# Patient Record
Sex: FEMALE | Race: WHITE | NOT HISPANIC OR LATINO | Employment: UNEMPLOYED | URBAN - METROPOLITAN AREA
[De-identification: names, ages, dates, MRNs, and addresses within clinical notes are randomized per-mention and may not be internally consistent; named-entity substitution may affect disease eponyms.]

---
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
## 2022-01-28 ENCOUNTER — OFFICE VISIT (OUTPATIENT)
Dept: URGENT CARE | Facility: CLINIC | Age: 22
End: 2022-01-28
Payer: COMMERCIAL

## 2022-01-28 VITALS
BODY MASS INDEX: 20.77 KG/M2 | WEIGHT: 110 LBS | HEIGHT: 61 IN | TEMPERATURE: 98 F | RESPIRATION RATE: 16 BRPM | HEART RATE: 92 BPM | DIASTOLIC BLOOD PRESSURE: 62 MMHG | SYSTOLIC BLOOD PRESSURE: 100 MMHG | OXYGEN SATURATION: 100 %

## 2022-01-28 DIAGNOSIS — L73.9 FOLLICULITIS: Primary | ICD-10-CM

## 2022-01-28 PROCEDURE — 1160F RVW MEDS BY RX/DR IN RCRD: CPT | Mod: CPTII,S$GLB,, | Performed by: PHYSICIAN ASSISTANT

## 2022-01-28 PROCEDURE — 3078F DIAST BP <80 MM HG: CPT | Mod: CPTII,S$GLB,, | Performed by: PHYSICIAN ASSISTANT

## 2022-01-28 PROCEDURE — 3078F PR MOST RECENT DIASTOLIC BLOOD PRESSURE < 80 MM HG: ICD-10-PCS | Mod: CPTII,S$GLB,, | Performed by: PHYSICIAN ASSISTANT

## 2022-01-28 PROCEDURE — 3008F PR BODY MASS INDEX (BMI) DOCUMENTED: ICD-10-PCS | Mod: CPTII,S$GLB,, | Performed by: PHYSICIAN ASSISTANT

## 2022-01-28 PROCEDURE — 3074F PR MOST RECENT SYSTOLIC BLOOD PRESSURE < 130 MM HG: ICD-10-PCS | Mod: CPTII,S$GLB,, | Performed by: PHYSICIAN ASSISTANT

## 2022-01-28 PROCEDURE — 99213 OFFICE O/P EST LOW 20 MIN: CPT | Mod: S$GLB,,, | Performed by: PHYSICIAN ASSISTANT

## 2022-01-28 PROCEDURE — 1159F MED LIST DOCD IN RCRD: CPT | Mod: CPTII,S$GLB,, | Performed by: PHYSICIAN ASSISTANT

## 2022-01-28 PROCEDURE — 3008F BODY MASS INDEX DOCD: CPT | Mod: CPTII,S$GLB,, | Performed by: PHYSICIAN ASSISTANT

## 2022-01-28 PROCEDURE — 99213 PR OFFICE/OUTPT VISIT, EST, LEVL III, 20-29 MIN: ICD-10-PCS | Mod: S$GLB,,, | Performed by: PHYSICIAN ASSISTANT

## 2022-01-28 PROCEDURE — 3074F SYST BP LT 130 MM HG: CPT | Mod: CPTII,S$GLB,, | Performed by: PHYSICIAN ASSISTANT

## 2022-01-28 PROCEDURE — 1160F PR REVIEW ALL MEDS BY PRESCRIBER/CLIN PHARMACIST DOCUMENTED: ICD-10-PCS | Mod: CPTII,S$GLB,, | Performed by: PHYSICIAN ASSISTANT

## 2022-01-28 PROCEDURE — 1159F PR MEDICATION LIST DOCUMENTED IN MEDICAL RECORD: ICD-10-PCS | Mod: CPTII,S$GLB,, | Performed by: PHYSICIAN ASSISTANT

## 2022-01-28 RX ORDER — SULFAMETHOXAZOLE AND TRIMETHOPRIM 800; 160 MG/1; MG/1
1 TABLET ORAL 2 TIMES DAILY
Qty: 20 TABLET | Refills: 0 | Status: SHIPPED | OUTPATIENT
Start: 2022-01-28 | End: 2022-02-07

## 2022-01-28 NOTE — PROGRESS NOTES
"Subjective:       Patient ID: Mee Rapp is a 21 y.o. female.    Vitals:  height is 5' 1" (1.549 m) and weight is 49.9 kg (110 lb). Her tympanic temperature is 97.8 °F (36.6 °C). Her blood pressure is 100/62 and her pulse is 92. Her respiration is 16 and oxygen saturation is 100%.     Chief Complaint: Groin Swelling (Red & swollen bump by vag area x 2 days )    Patient presented here today w/Red & swollen bump by Rt vulva x 1  days. Pt denies fever or drainage. No vag d/c, rash, pelvic pain, fever, N/V, dysuria, hematuria or concern for STD      Vaginal Pain  The patient's pertinent negatives include no genital itching, genital lesions, genital odor, genital rash, missed menses, pelvic pain, vaginal bleeding or vaginal discharge. This is a new problem. The current episode started yesterday. The problem occurs constantly. The problem has been unchanged. The pain is mild. She is not pregnant. Pertinent negatives include no abdominal pain, anorexia, back pain, chills, constipation, diarrhea, discolored urine, dysuria, fever, flank pain, frequency, headaches, hematuria, joint pain, joint swelling, nausea, painful intercourse, rash, sore throat, urgency or vomiting. She has tried heating pads, warm baths and NSAIDs for the symptoms. The treatment provided mild relief. She is not sexually active. No, her partner does not have an STD. She uses nothing for contraception. Her menstrual history has been regular. There is no history of ovarian cysts.       Constitution: Negative for chills and fever.   HENT: Negative for sore throat.    Gastrointestinal: Negative for abdominal pain, nausea, vomiting, constipation and diarrhea.   Genitourinary: Positive for vaginal pain. Negative for dysuria, frequency, urgency, urine decreased, flank pain, hematuria, painful menstruation, irregular menstruation, missed menses, heavy menstrual bleeding, ovarian cysts, genital trauma, vaginal discharge, vaginal bleeding, vaginal odor, painful " intercourse, genital sore, painful ejaculation and pelvic pain.   Musculoskeletal: Negative for back pain.   Skin: Positive for skin thickening/induration. Negative for rash, wound and abrasion.   Neurological: Negative for headaches.       Objective:      Physical Exam   Constitutional: She is oriented to person, place, and time. She appears well-developed and well-nourished.   HENT:   Head: Normocephalic and atraumatic.   Ears:   Right Ear: External ear normal.   Left Ear: External ear normal.   Nose: Nose normal. No nasal deformity. No epistaxis.   Mouth/Throat: Oropharynx is clear and moist and mucous membranes are normal.   Eyes: Lids are normal.   Neck: Trachea normal and phonation normal. Neck supple.   Cardiovascular: Normal pulses.   Pulmonary/Chest: Effort normal.   Abdominal: Normal appearance and bowel sounds are normal. She exhibits no distension. Soft. There is no abdominal tenderness. There is no CVA tenderness.   Genitourinary:         Neurological: She is alert and oriented to person, place, and time.   Skin: Skin is warm, dry and intact.   Psychiatric: She has a normal mood and affect. Her speech is normal and behavior is normal. Cognition and memory  Nursing note and vitals reviewed.        Assessment:       1. Folliculitis          Plan:         Folliculitis    Other orders  -     sulfamethoxazole-trimethoprim 800-160mg (BACTRIM DS) 800-160 mg Tab; Take 1 tablet by mouth 2 (two) times daily. for 10 days  Dispense: 20 tablet; Refill: 0    Estefany Valencia PA-C  [facility]         Patient Instructions   Warm epsom salt soaks 3-4 x daily. Complete bactrim for at least 7 up to 10 days.     Closely monitor area for any spreading redness, abscess (head to infection which would need to be opened), or fever - this warrants re-evaluation. Avoid shaving the area. Patient Education       Bacterial Folliculitis   The Basics   Written by the doctors and editors at [employer]To[employer]   What is  folliculitis? -- Folliculitis is a skin problem that happens when a hair follicle gets infected (figure 1). A hair follicle is a sac under the skin where a hair starts to grow. Usually, folliculitis happens because bacteria (a kind of germ) get into the hair follicle. Occasionally, folliculitis is caused by a fungus or virus in the hair follicle, or because of another reason.  What are the symptoms of bacterial folliculitis? -- The main symptom is a group of small, raised red bumps on your skin. These bumps can be tender or itchy, and they might have pus in them.  What can I do on my own to treat it? -- Wet a clean washcloth with warm water and put it on the bumps. When the cloth cools, reheat it with warm water and put it back on the area. Repeat these steps for 10 to 15 minutes, 3 times a day.  Do not shave the area that has folliculitis. That will just irritate it more and might spread the infection.  Should I see a doctor or nurse? -- See your doctor or nurse if the folliculitis does not go away after you treat it at home. You should also see your doctor or nurse if:  · The bumps get larger or more painful  · The bumps go away but then come back  · You get a fever  Will I need tests? -- Not usually. To make sure you do not have another skin condition, your doctor or nurse will ask about your symptoms and do an exam. If it is hard to tell what is causing your folliculitis, they might test a sample of pus, or do a different test.  What other treatment might I have? -- If your bacterial folliculitis does not go away on its own, your doctor might prescribe an antibiotic cream, ointment, or liquid to put on your skin. If a lot of your skin is affected, you might need an antibiotic pill. But most cases of folliculitis get better without treatment.  All topics are updated as new evidence becomes available and our peer review process is complete.  This topic retrieved from [email or web address] on: Sep 21, 2021.  Topic 57446 Version  6.0  Release: 29.4.2 - C29.263  © 2021 UpToDate, Inc. and/or its affiliates. All rights reserved.  figure 1: Infected hair follicle     Graphic 24056 Version 1.0    Consumer Information Use and Disclaimer   This information is not specific medical advice and does not replace information you receive from your health care provider. This is only a brief summary of general information. It does NOT include all information about conditions, illnesses, injuries, tests, procedures, treatments, therapies, discharge instructions or life-style choices that may apply to you. You must talk with your health care provider for complete information about your health and treatment options. This information should not be used to decide whether or not to accept your health care provider's advice, instructions or recommendations. Only your health care provider has the knowledge and training to provide advice that is right for you. The use of this information is governed by the [employer] End User License Agreement, available at https://www.Waterfall.TeachStreet/en/solutions/[employer]/about/kai.The use of [facility] content is governed by the [employer] Terms of Use. ©2021 UpToDate, Inc. All rights reserved.  Copyright   © 2021 UpToDate, Inc. and/or its affiliates. All rights reserved.

## 2022-01-28 NOTE — LETTER
January 28, 2022      [facility]  [address] [place] [address] [place] [address]  Phone: [phone]  Fax: [phone]       Patient: Mee Rapp   YOB: 2000  Date of Visit: 01/28/2022    To Whom It May Concern:    Susy Rapp  was at [facility] on 01/28/2022. The patient may return to work/school on 01/29/22 with no restrictions. If you have any questions or concerns, or if I can be of further assistance, please do not hesitate to contact me.    Sincerely,    Estefany Valencia PA-C

## 2022-01-28 NOTE — PATIENT INSTRUCTIONS
IORT Simulation Note    Date: 3/4/2021  Patient Name: Osvaldo Strickland  YOB: 1966  MRN: [id number]    The patient has undergone a lumpectomy and sentinel lymph node biopsy for her early stage breast cancer. After evaluation of the specimens, the decision has been confirmed that she continues to be a candidate for intraoperative radiotherapy. The  3.5 cm intraoperative radiotherapy applicator was then placed and purse string sutures were placed at the incision site to minimize incision site skin dose. Under ultrasound guidance, the smallest distance from the applicator to the skin surface was found to be 18.2 mm. I judged this distance to be acceptable based upon the standard of care. I then approved treatment to this region. I was present throughout the simulation. Warm epsom salt soaks 3-4 x daily. Complete bactrim for at least 7 up to 10 days.     Closely monitor area for any spreading redness, abscess (head to infection which would need to be opened), or fever - this warrants re-evaluation. Avoid shaving the area. Patient Education       Bacterial Folliculitis   The Basics   Written by the doctors and editors at [facility]   What is folliculitis? -- Folliculitis is a skin problem that happens when a hair follicle gets infected (figure 1). A hair follicle is a sac under the skin where a hair starts to grow. Usually, folliculitis happens because bacteria (a kind of germ) get into the hair follicle. Occasionally, folliculitis is caused by a fungus or virus in the hair follicle, or because of another reason.  What are the symptoms of bacterial folliculitis? -- The main symptom is a group of small, raised red bumps on your skin. These bumps can be tender or itchy, and they might have pus in them.  What can I do on my own to treat it? -- Wet a clean washcloth with warm water and put it on the bumps. When the cloth cools, reheat it with warm water and put it back on the area. Repeat these steps for 10 to 15 minutes, 3 times a day.  Do not shave the area that has folliculitis. That will just irritate it more and might spread the infection.  Should I see a doctor or nurse? -- See your doctor or nurse if the folliculitis does not go away after you treat it at home. You should also see your doctor or nurse if:  · The bumps get larger or more painful  · The bumps go away but then come back  · You get a fever  Will I need tests? -- Not usually. To make sure you do not have another skin condition, your doctor or nurse will ask about your symptoms and do an exam. If it is hard to tell what is causing your folliculitis, they might test a sample of pus, or do a different test.  What other treatment might I have? -- If your bacterial folliculitis does not go away on its own, your doctor might  prescribe an antibiotic cream, ointment, or liquid to put on your skin. If a lot of your skin is affected, you might need an antibiotic pill. But most cases of folliculitis get better without treatment.  All topics are updated as new evidence becomes available and our peer review process is complete.  This topic retrieved from [employer] on: Sep 21, 2021.  Topic 98490 Version 6.0  Release: 29.4.2 - C29.263  © 2021 UpToDate, Inc. and/or its affiliates. All rights reserved.  figure 1: Infected hair follicle     Graphic 15551 Version 1.0    Consumer Information Use and Disclaimer   This information is not specific medical advice and does not replace information you receive from your health care provider. This is only a brief summary of general information. It does NOT include all information about conditions, illnesses, injuries, tests, procedures, treatments, therapies, discharge instructions or life-style choices that may apply to you. You must talk with your health care provider for complete information about your health and treatment options. This information should not be used to decide whether or not to accept your health care provider's advice, instructions or recommendations. Only your health care provider has the knowledge and training to provide advice that is right for you. The use of this information is governed by the [employer] End User License Agreement, available at https://www.[employer].8aweek/en/solutions/[employer]/about/kai.The use of [employer] content is governed by the [employer] Terms of Use. ©2021 UpToDate, Inc. All rights reserved.  Copyright   © 2021 UpToDate, Inc. and/or its affiliates. All rights reserved.

## 2022-01-31 ENCOUNTER — TELEPHONE (OUTPATIENT)
Dept: URGENT CARE | Facility: CLINIC | Age: 22
End: 2022-01-31
Payer: COMMERCIAL

## 2022-10-31 ENCOUNTER — IMMUNIZATION (OUTPATIENT)
Dept: URGENT CARE | Facility: CLINIC | Age: 22
End: 2022-10-31
Payer: COMMERCIAL

## 2022-10-31 PROCEDURE — 90471 IMMUNIZATION ADMIN: CPT | Mod: S$GLB,,,

## 2022-10-31 PROCEDURE — 90471 FLU VACCINE (QUAD) GREATER THAN OR EQUAL TO 3YO PRESERVATIVE FREE IM: ICD-10-PCS | Mod: S$GLB,,,

## 2022-10-31 PROCEDURE — 90686 FLU VACCINE (QUAD) GREATER THAN OR EQUAL TO 3YO PRESERVATIVE FREE IM: ICD-10-PCS | Mod: S$GLB,,,

## 2022-10-31 PROCEDURE — 90686 IIV4 VACC NO PRSV 0.5 ML IM: CPT | Mod: S$GLB,,,

## 2022-10-31 NOTE — LETTER
October 31, 2022      [facility]  [address] [place] [address] [place] [address]  Phone: [phone]  Fax: [phone]       Patient: Mee Rapp   YOB: 2000  Date of Visit: 10/31/2022    To Whom It May Concern:    Susy Rapp  was at [facility] on 10/31/2022. The patient may return to work/school on 10/31/2022 with no restrictions. If you have any questions or concerns, or if I can be of further assistance, please do not hesitate to contact me.    Sincerely,          Aashish Milner MA

## 2023-09-25 ENCOUNTER — OFFICE VISIT (OUTPATIENT)
Dept: URGENT CARE | Facility: CLINIC | Age: 23
End: 2023-09-25
Payer: COMMERCIAL

## 2023-09-25 VITALS
BODY MASS INDEX: 20.77 KG/M2 | SYSTOLIC BLOOD PRESSURE: 113 MMHG | HEIGHT: 61 IN | OXYGEN SATURATION: 100 % | DIASTOLIC BLOOD PRESSURE: 65 MMHG | TEMPERATURE: 99 F | HEART RATE: 84 BPM | WEIGHT: 110 LBS | RESPIRATION RATE: 16 BRPM

## 2023-09-25 DIAGNOSIS — J02.9 SORE THROAT: Primary | ICD-10-CM

## 2023-09-25 LAB
CTP QC/QA: YES
CTP QC/QA: YES
HETEROPH AB SER QL: NEGATIVE
MOLECULAR STREP A: NEGATIVE

## 2023-09-25 PROCEDURE — 86308 HETEROPHILE ANTIBODY SCREEN: CPT | Mod: QW,S$GLB,,

## 2023-09-25 PROCEDURE — 87651 POCT STREP A MOLECULAR: ICD-10-PCS | Mod: QW,S$GLB,,

## 2023-09-25 PROCEDURE — 99213 PR OFFICE/OUTPT VISIT, EST, LEVL III, 20-29 MIN: ICD-10-PCS | Mod: S$GLB,,,

## 2023-09-25 PROCEDURE — 99213 OFFICE O/P EST LOW 20 MIN: CPT | Mod: S$GLB,,,

## 2023-09-25 PROCEDURE — 87651 STREP A DNA AMP PROBE: CPT | Mod: QW,S$GLB,,

## 2023-09-25 PROCEDURE — 86308 POCT INFECTIOUS MONONUCLEOSIS: ICD-10-PCS | Mod: QW,S$GLB,,

## 2023-09-25 RX ORDER — BENZONATATE 200 MG/1
200 CAPSULE ORAL 3 TIMES DAILY PRN
Qty: 30 CAPSULE | Refills: 0 | Status: SHIPPED | OUTPATIENT
Start: 2023-09-25

## 2023-09-25 RX ORDER — ESCITALOPRAM OXALATE 10 MG/1
10 TABLET ORAL DAILY
COMMUNITY

## 2023-09-25 NOTE — PATIENT INSTRUCTIONS
PLEASE READ YOUR DISCHARGE INSTRUCTIONS ENTIRELY AS IT CONTAINS IMPORTANT INFORMATION.    Please drink plenty of fluids.    Please get plenty of rest.    Please return here or go to the Emergency Department for any concerns or worsening of condition.    Please take an over the counter antihistamine medication (Allegra/Claritin/Zyrtec) of your choice as directed for allergy symptoms and/or runny nose and postnasal drip.    Try an over the counter decongestant for sinus pressure/ear pressure, congestion symptoms like Mucinex D or Sudafed or Phenylephrine. You buy this behind the pharmacy counter.    If you do have Hypertension or palpitations, it is safe to take Coricidin HBP for relief of sinus symptoms.    Tylenol or ibuprofen can also be used as directed for pain and fever unless you have an allergy to them or medical condition such as stomach ulcers, kidney or liver disease or blood thinners etc for which you should not be taking these type of medications.     Sore throat recommendations: Warm fluids, warm salt water gargles, throat lozenges, tea, honey, soup, rest, hydration.    Use over the counter Flonase or Nasocort: one spray each nostril twice daily OR two sprays each nostril once daily until nares dry out, unless you have Glaucoma.   Can also supplement with nasal saline rinse.    Sinus rinses DO NOT USE TAP WATER, if you must, water must be at a rolling boil for 1 minute, let it cool, then use.  May use distilled water, or over the counter nasal saline rinses.  Keegan's vapor rub in shower to help open nasal passages.  May use nasal gel to keep passages moisturized.  May use nasal saline sprays during the day for added relief of congestion.   For those who go to the gym, please do not use the sauna or steam room now to clear sinuses.    Cough     Rest and fluids are important  Can use honey with dimitri to soothe your throat    Robitussin or Delsyum for cough suppressant for dry cough.    Mucinex DM or  products containing Guaifenesin or Dextromethorphan for expectorant (wet cough).    Take prescription cough meds (pills) as prescribed; take prescription cough syrup at night as needed for cough.  Do not take both the prescribed cough pills and syrup at the same time or within 6 hours of each other.  Do not take the cough syrup with any other sedative medication as it can can cause drowsiness. Do not operate any heavy machinery, drink or drive while taking the cough syrup.     Please follow up with your primary care doctor or specialist in the next 48-72hrs as needed and if no improvement    If you smoke, please stop smoking.    Please return or see your primary care doctor if you develop new or worsening symptoms.     Lastly, good hand washing and cough hygiene (cough into your elbow) will help prevent the spread of the illness. A general rule is that you are no longer contagious once you have been without a fever for over 24 hours without requiring fever reducing medications.     Please arrange follow up with your primary medical clinic as soon as possible. You must understand that you've received an Urgent Care treatment only and that you may be released before all of your medical problems are known or treated. You, the patient, will arrange for follow up as instructed. If your symptoms worsen or fail to improve you should go to the Emergency Room.

## 2023-09-25 NOTE — LETTER
September 25, 2023      [facility] [address] [place] [address]  Phone: [phone]  Fax: [phone]       Patient: Mee Rapp   YOB: 2000  Date of Visit: 09/25/2023    To Whom It May Concern:    Susy Rapp  was at [facility] on 09/25/2023. The patient may return to work/school on 9/27/23 with no restrictions. If you have any questions or concerns, or if I can be of further assistance, please do not hesitate to contact me.    Sincerely,    Charu Swain PA-C

## 2023-09-25 NOTE — PROGRESS NOTES
"Subjective:      Patient ID: Mee Rapp is a 23 y.o. female.    Vitals:  height is 5' 1" (1.549 m) and weight is 49.9 kg (110 lb). Her temperature is 98.6 °F (37 °C). Her blood pressure is 113/65 and her pulse is 84. Her respiration is 16 and oxygen saturation is 100%.     Chief Complaint: Sore Throat    Mee Rapp is a 23 y.o. female who presents for sore throat which onset 3 weeks ago. Associated sxs include hoarse voice, cough, and HA. Patient denies any fever, chills, SOB, CP, abd pain, n/v, rash, dizziness, or numbness/tingling. Prior Tx includes tylenol.    Sore Throat   This is a new problem. The current episode started 1 to 4 weeks ago. The problem has been unchanged. There has been no fever. The pain is at a severity of 5/10. The pain is mild. Associated symptoms include coughing, diarrhea, headaches and a hoarse voice. Pertinent negatives include no abdominal pain, congestion, drooling, ear discharge, ear pain, plugged ear sensation, neck pain, shortness of breath, stridor, swollen glands, trouble swallowing or vomiting. She has had no exposure to strep or mono. She has tried acetaminophen for the symptoms. The treatment provided mild relief.       Constitution: Negative for appetite change, chills, sweating, fatigue and fever.   HENT:  Positive for sore throat. Negative for ear pain, ear discharge, hearing loss, drooling, congestion, postnasal drip, sinus pain, sinus pressure and trouble swallowing.    Neck: Negative for neck pain.   Cardiovascular:  Negative for chest pain.   Respiratory:  Positive for cough. Negative for sputum production, shortness of breath and stridor.    Gastrointestinal:  Positive for diarrhea. Negative for abdominal pain, nausea and vomiting.   Musculoskeletal:  Negative for muscle ache.   Neurological:  Positive for headaches. Negative for dizziness, numbness and tingling.      Objective:     Physical Exam   Constitutional: She is oriented to person, place, and time. She " appears well-developed. She is cooperative.  Non-toxic appearance. She does not appear ill. No distress.   HENT:   Head: Normocephalic and atraumatic.   Ears:   Right Ear: Hearing, tympanic membrane, external ear and ear canal normal.   Left Ear: Hearing, tympanic membrane, external ear and ear canal normal.   Nose: Nose normal. No mucosal edema or nasal deformity. No epistaxis. Right sinus exhibits no maxillary sinus tenderness and no frontal sinus tenderness. Left sinus exhibits no maxillary sinus tenderness and no frontal sinus tenderness.   Mouth/Throat: Uvula is midline, oropharynx is clear and moist and mucous membranes are normal. Mucous membranes are moist. No trismus in the jaw. Normal dentition. No uvula swelling. No oropharyngeal exudate, posterior oropharyngeal edema or posterior oropharyngeal erythema. Tonsils are 0 on the right. Tonsils are 0 on the left. No tonsillar exudate.   Eyes: Conjunctivae and lids are normal. No scleral icterus. Extraocular movement intact   Neck: Trachea normal and phonation normal. Neck supple. No edema present. No erythema present. No neck rigidity present.   Cardiovascular: Normal rate, regular rhythm, normal heart sounds and normal pulses.   Pulmonary/Chest: Effort normal and breath sounds normal. No stridor. No respiratory distress. She has no decreased breath sounds. She has no wheezes. She has no rhonchi. She has no rales.   Abdominal: Normal appearance.   Musculoskeletal: Normal range of motion.         General: No deformity. Normal range of motion.   Neurological: She is alert and oriented to person, place, and time. She exhibits normal muscle tone. Coordination normal.   Skin: Skin is warm, dry, intact, not diaphoretic and not pale.   Psychiatric: Her speech is normal and behavior is normal. Judgment and thought content normal.   Nursing note and vitals reviewed.      Assessment:     1. Sore throat        Results for orders placed or performed in visit on 09/25/23    POCT Strep A, Molecular   Result Value Ref Range    Molecular Strep A, POC Negative Negative     Acceptable Yes    POCT Infectious mononucleosis antibody   Result Value Ref Range    Monospot Negative Negative     Acceptable Yes        Plan:       Sore throat  -     POCT Strep A, Molecular  -     POCT Infectious mononucleosis antibody  -     benzonatate (TESSALON) 200 MG capsule; Take 1 capsule (200 mg total) by mouth 3 (three) times daily as needed for Cough.  Dispense: 30 capsule; Refill: 0  -     (Magic mouthwash) 1:1:1 diphenhydrAMINE(Benadryl) 12.5mg/5ml liq, aluminum & magnesium hydroxide-simethicone (Maalox), LIDOcaine viscous 2%; Swish and spit 5 mLs every 4 (four) hours as needed (for sore throat).  Dispense: 90 mL; Refill: 0  -     Ambulatory referral/consult to ENT      Afebrile. VSS. Patient is in NAD.  Discussed negative results with patient.  Educated patient on viral vs bacterial pharyngitis.  Meds: tessalon perles and magic mouthwash sent to preferred pharmacy.  Advised patient to begin oral antihistamine and Flonase for symptom relief.    Increase fluid intake and plenty of rest.  Tylenol/Ibuprofen (as permitted) as needed for any pain or discomfort.  If symptoms do not resolve, return to clinic for further evaluation.  ER precautions given such as SOB, CP, or fever not resolved with fever-reducing medications.